# Patient Record
Sex: FEMALE | Race: WHITE | NOT HISPANIC OR LATINO | Employment: OTHER | URBAN - METROPOLITAN AREA
[De-identification: names, ages, dates, MRNs, and addresses within clinical notes are randomized per-mention and may not be internally consistent; named-entity substitution may affect disease eponyms.]

---

## 2023-06-05 PROBLEM — I25.10 PRESENCE OF STENT IN CORONARY ARTERY IN PATIENT WITH CORONARY ARTERY DISEASE: Status: ACTIVE | Noted: 2023-06-05

## 2023-06-05 PROBLEM — M85.80 OSTEOPENIA: Status: ACTIVE | Noted: 2023-06-05

## 2023-06-05 PROBLEM — M17.9 OSTEOARTHRITIS OF KNEE: Status: ACTIVE | Noted: 2023-06-05

## 2023-06-05 PROBLEM — E78.5 HYPERLIPIDEMIA: Status: ACTIVE | Noted: 2023-06-05

## 2023-06-05 PROBLEM — Z95.5 PRESENCE OF STENT IN CORONARY ARTERY IN PATIENT WITH CORONARY ARTERY DISEASE: Status: ACTIVE | Noted: 2023-06-05

## 2023-06-05 PROBLEM — R13.10 DYSPHAGIA: Status: ACTIVE | Noted: 2023-06-05

## 2023-06-05 PROBLEM — R53.83 FATIGUE: Status: ACTIVE | Noted: 2023-06-05

## 2023-06-05 PROBLEM — J30.9 ALLERGIC RHINITIS: Status: ACTIVE | Noted: 2023-06-05

## 2023-06-05 PROBLEM — R42 DIZZINESS: Status: ACTIVE | Noted: 2023-06-05

## 2023-06-05 PROBLEM — K22.2 SCHATZKI'S RING OF DISTAL ESOPHAGUS: Status: ACTIVE | Noted: 2023-06-05

## 2023-06-05 PROBLEM — I10 HTN (HYPERTENSION), BENIGN: Status: ACTIVE | Noted: 2023-06-05

## 2023-06-05 PROBLEM — I25.10 CORONARY ARTERY DISEASE INVOLVING NATIVE CORONARY ARTERY OF NATIVE HEART WITHOUT ANGINA PECTORIS: Status: ACTIVE | Noted: 2023-06-05

## 2023-06-05 PROBLEM — I21.9 MYOCARDIAL INFARCTION (MULTI): Status: ACTIVE | Noted: 2023-06-05

## 2023-06-05 PROBLEM — M79.10 MYALGIA: Status: ACTIVE | Noted: 2023-06-05

## 2023-06-05 PROBLEM — E66.9 OBESITY (BMI 30-39.9): Status: ACTIVE | Noted: 2023-06-05

## 2023-06-05 PROBLEM — Z86.010 HISTORY OF COLON POLYPS: Status: ACTIVE | Noted: 2023-06-05

## 2023-06-05 PROBLEM — K29.80 DUODENITIS: Status: ACTIVE | Noted: 2023-06-05

## 2023-06-05 PROBLEM — Z95.5 PRESENCE OF STENT IN CORONARY ARTERY: Status: ACTIVE | Noted: 2023-06-05

## 2023-06-05 PROBLEM — K44.9 HIATAL HERNIA: Status: ACTIVE | Noted: 2023-06-05

## 2023-06-05 PROBLEM — F32.A DEPRESSION: Status: ACTIVE | Noted: 2023-06-05

## 2023-06-05 PROBLEM — Z86.0100 HISTORY OF COLON POLYPS: Status: ACTIVE | Noted: 2023-06-05

## 2023-06-05 PROBLEM — K29.70 GASTRITIS: Status: ACTIVE | Noted: 2023-06-05

## 2023-06-05 RX ORDER — OMEPRAZOLE 20 MG/1
1 CAPSULE, DELAYED RELEASE ORAL DAILY
COMMUNITY
Start: 2022-12-06 | End: 2023-10-25 | Stop reason: SDUPTHER

## 2023-06-05 RX ORDER — METOPROLOL SUCCINATE 50 MG/1
1 TABLET, EXTENDED RELEASE ORAL DAILY
COMMUNITY
Start: 2014-08-05

## 2023-06-05 RX ORDER — CLOPIDOGREL BISULFATE 75 MG/1
1 TABLET ORAL DAILY
COMMUNITY
Start: 2021-01-19 | End: 2023-12-12 | Stop reason: SDUPTHER

## 2023-06-05 RX ORDER — ATORVASTATIN CALCIUM 40 MG/1
1 TABLET, FILM COATED ORAL DAILY
COMMUNITY
Start: 2020-12-22 | End: 2023-10-16 | Stop reason: SDUPTHER

## 2023-06-05 RX ORDER — MULTIVITAMIN
TABLET ORAL
COMMUNITY

## 2023-06-05 RX ORDER — ASPIRIN 81 MG/1
TABLET ORAL
COMMUNITY

## 2023-06-05 RX ORDER — METOPROLOL TARTRATE AND HYDROCHLOROTHIAZIDE 50; 25 MG/1; MG/1
1 TABLET ORAL DAILY
COMMUNITY
Start: 2022-07-27 | End: 2023-07-31 | Stop reason: SDUPTHER

## 2023-06-06 ENCOUNTER — OFFICE VISIT (OUTPATIENT)
Dept: PRIMARY CARE | Facility: CLINIC | Age: 73
End: 2023-06-06
Payer: MEDICARE

## 2023-06-06 ENCOUNTER — LAB (OUTPATIENT)
Dept: LAB | Facility: LAB | Age: 73
End: 2023-06-06
Payer: MEDICARE

## 2023-06-06 VITALS
RESPIRATION RATE: 18 BRPM | SYSTOLIC BLOOD PRESSURE: 132 MMHG | WEIGHT: 206 LBS | DIASTOLIC BLOOD PRESSURE: 80 MMHG | TEMPERATURE: 95.7 F | OXYGEN SATURATION: 97 % | HEART RATE: 77 BPM | BODY MASS INDEX: 35.36 KG/M2

## 2023-06-06 DIAGNOSIS — Z00.00 ANNUAL PHYSICAL EXAM: ICD-10-CM

## 2023-06-06 DIAGNOSIS — I25.10 PRESENCE OF STENT IN CORONARY ARTERY IN PATIENT WITH CORONARY ARTERY DISEASE: ICD-10-CM

## 2023-06-06 DIAGNOSIS — I25.10 CORONARY ARTERY DISEASE INVOLVING NATIVE CORONARY ARTERY OF NATIVE HEART WITHOUT ANGINA PECTORIS: ICD-10-CM

## 2023-06-06 DIAGNOSIS — Z95.5 PRESENCE OF STENT IN CORONARY ARTERY IN PATIENT WITH CORONARY ARTERY DISEASE: ICD-10-CM

## 2023-06-06 DIAGNOSIS — E66.01 OBESITY, MORBID (MULTI): ICD-10-CM

## 2023-06-06 DIAGNOSIS — I10 HTN (HYPERTENSION), BENIGN: ICD-10-CM

## 2023-06-06 DIAGNOSIS — Z13.31 DEPRESSION SCREEN: ICD-10-CM

## 2023-06-06 DIAGNOSIS — Z71.89 ADVANCE DIRECTIVE DISCUSSED WITH PATIENT: ICD-10-CM

## 2023-06-06 DIAGNOSIS — Z86.010 HISTORY OF COLON POLYPS: ICD-10-CM

## 2023-06-06 DIAGNOSIS — Z12.31 ENCOUNTER FOR SCREENING MAMMOGRAM FOR BREAST CANCER: ICD-10-CM

## 2023-06-06 DIAGNOSIS — Z13.39 ALCOHOL SCREENING: ICD-10-CM

## 2023-06-06 DIAGNOSIS — Z00.00 MEDICARE ANNUAL WELLNESS VISIT, SUBSEQUENT: Primary | ICD-10-CM

## 2023-06-06 LAB
ALANINE AMINOTRANSFERASE (SGPT) (U/L) IN SER/PLAS: 15 U/L (ref 7–45)
ALBUMIN (G/DL) IN SER/PLAS: 4.3 G/DL (ref 3.4–5)
ALKALINE PHOSPHATASE (U/L) IN SER/PLAS: 80 U/L (ref 33–136)
ANION GAP IN SER/PLAS: 14 MMOL/L (ref 10–20)
ASPARTATE AMINOTRANSFERASE (SGOT) (U/L) IN SER/PLAS: 18 U/L (ref 9–39)
BASOPHILS (10*3/UL) IN BLOOD BY AUTOMATED COUNT: 0.04 X10E9/L (ref 0–0.1)
BASOPHILS/100 LEUKOCYTES IN BLOOD BY AUTOMATED COUNT: 0.4 % (ref 0–2)
BILIRUBIN DIRECT (MG/DL) IN SER/PLAS: 0.1 MG/DL (ref 0–0.3)
BILIRUBIN TOTAL (MG/DL) IN SER/PLAS: 0.9 MG/DL (ref 0–1.2)
CALCIUM (MG/DL) IN SER/PLAS: 9.6 MG/DL (ref 8.6–10.3)
CARBON DIOXIDE, TOTAL (MMOL/L) IN SER/PLAS: 33 MMOL/L (ref 21–32)
CHLORIDE (MMOL/L) IN SER/PLAS: 99 MMOL/L (ref 98–107)
CHOLESTEROL (MG/DL) IN SER/PLAS: 197 MG/DL (ref 0–199)
CHOLESTEROL IN HDL (MG/DL) IN SER/PLAS: 57.9 MG/DL
CHOLESTEROL/HDL RATIO: 3.4
CREATININE (MG/DL) IN SER/PLAS: 1.02 MG/DL (ref 0.5–1.05)
EOSINOPHILS (10*3/UL) IN BLOOD BY AUTOMATED COUNT: 0.16 X10E9/L (ref 0–0.4)
EOSINOPHILS/100 LEUKOCYTES IN BLOOD BY AUTOMATED COUNT: 1.7 % (ref 0–6)
ERYTHROCYTE DISTRIBUTION WIDTH (RATIO) BY AUTOMATED COUNT: 12.5 % (ref 11.5–14.5)
ERYTHROCYTE MEAN CORPUSCULAR HEMOGLOBIN CONCENTRATION (G/DL) BY AUTOMATED: 32.4 G/DL (ref 32–36)
ERYTHROCYTE MEAN CORPUSCULAR VOLUME (FL) BY AUTOMATED COUNT: 95 FL (ref 80–100)
ERYTHROCYTES (10*6/UL) IN BLOOD BY AUTOMATED COUNT: 4.59 X10E12/L (ref 4–5.2)
GFR FEMALE: 58 ML/MIN/1.73M2
GLUCOSE (MG/DL) IN SER/PLAS: 92 MG/DL (ref 74–99)
HEMATOCRIT (%) IN BLOOD BY AUTOMATED COUNT: 43.5 % (ref 36–46)
HEMOGLOBIN (G/DL) IN BLOOD: 14.1 G/DL (ref 12–16)
IMMATURE GRANULOCYTES/100 LEUKOCYTES IN BLOOD BY AUTOMATED COUNT: 0.3 % (ref 0–0.9)
LEUKOCYTES (10*3/UL) IN BLOOD BY AUTOMATED COUNT: 9.3 X10E9/L (ref 4.4–11.3)
LYMPHOCYTES (10*3/UL) IN BLOOD BY AUTOMATED COUNT: 2.08 X10E9/L (ref 0.8–3)
LYMPHOCYTES/100 LEUKOCYTES IN BLOOD BY AUTOMATED COUNT: 22.4 % (ref 13–44)
MONOCYTES (10*3/UL) IN BLOOD BY AUTOMATED COUNT: 0.84 X10E9/L (ref 0.05–0.8)
MONOCYTES/100 LEUKOCYTES IN BLOOD BY AUTOMATED COUNT: 9.1 % (ref 2–10)
NEUTROPHILS (10*3/UL) IN BLOOD BY AUTOMATED COUNT: 6.12 X10E9/L (ref 1.6–5.5)
NEUTROPHILS/100 LEUKOCYTES IN BLOOD BY AUTOMATED COUNT: 66.1 % (ref 40–80)
NON-HDL CHOLESTEROL: 139 MG/DL
NRBC (PER 100 WBCS) BY AUTOMATED COUNT: 0 /100 WBC (ref 0–0)
PLATELETS (10*3/UL) IN BLOOD AUTOMATED COUNT: 309 X10E9/L (ref 150–450)
POTASSIUM (MMOL/L) IN SER/PLAS: 4 MMOL/L (ref 3.5–5.3)
PROTEIN TOTAL: 7.1 G/DL (ref 6.4–8.2)
SODIUM (MMOL/L) IN SER/PLAS: 142 MMOL/L (ref 136–145)
THYROTROPIN (MIU/L) IN SER/PLAS BY DETECTION LIMIT <= 0.05 MIU/L: 2.59 MIU/L (ref 0.44–3.98)
UREA NITROGEN (MG/DL) IN SER/PLAS: 20 MG/DL (ref 6–23)

## 2023-06-06 PROCEDURE — 80048 BASIC METABOLIC PNL TOTAL CA: CPT

## 2023-06-06 PROCEDURE — 1159F MED LIST DOCD IN RCRD: CPT | Performed by: INTERNAL MEDICINE

## 2023-06-06 PROCEDURE — 99213 OFFICE O/P EST LOW 20 MIN: CPT | Performed by: INTERNAL MEDICINE

## 2023-06-06 PROCEDURE — 1036F TOBACCO NON-USER: CPT | Performed by: INTERNAL MEDICINE

## 2023-06-06 PROCEDURE — 99497 ADVNCD CARE PLAN 30 MIN: CPT | Performed by: INTERNAL MEDICINE

## 2023-06-06 PROCEDURE — 83718 ASSAY OF LIPOPROTEIN: CPT

## 2023-06-06 PROCEDURE — 82465 ASSAY BLD/SERUM CHOLESTEROL: CPT

## 2023-06-06 PROCEDURE — 85025 COMPLETE CBC W/AUTO DIFF WBC: CPT

## 2023-06-06 PROCEDURE — 1170F FXNL STATUS ASSESSED: CPT | Performed by: INTERNAL MEDICINE

## 2023-06-06 PROCEDURE — 84443 ASSAY THYROID STIM HORMONE: CPT

## 2023-06-06 PROCEDURE — 36415 COLL VENOUS BLD VENIPUNCTURE: CPT

## 2023-06-06 PROCEDURE — 99397 PER PM REEVAL EST PAT 65+ YR: CPT | Performed by: INTERNAL MEDICINE

## 2023-06-06 PROCEDURE — 3079F DIAST BP 80-89 MM HG: CPT | Performed by: INTERNAL MEDICINE

## 2023-06-06 PROCEDURE — G0439 PPPS, SUBSEQ VISIT: HCPCS | Performed by: INTERNAL MEDICINE

## 2023-06-06 PROCEDURE — G0444 DEPRESSION SCREEN ANNUAL: HCPCS | Performed by: INTERNAL MEDICINE

## 2023-06-06 PROCEDURE — 3075F SYST BP GE 130 - 139MM HG: CPT | Performed by: INTERNAL MEDICINE

## 2023-06-06 PROCEDURE — 80076 HEPATIC FUNCTION PANEL: CPT

## 2023-06-06 PROCEDURE — G0442 ANNUAL ALCOHOL SCREEN 15 MIN: HCPCS | Performed by: INTERNAL MEDICINE

## 2023-06-06 PROCEDURE — G0446 INTENS BEHAVE THER CARDIO DX: HCPCS | Performed by: INTERNAL MEDICINE

## 2023-06-06 RX ORDER — IBUPROFEN 600 MG/1
TABLET ORAL EVERY 8 HOURS
COMMUNITY

## 2023-06-06 ASSESSMENT — ACTIVITIES OF DAILY LIVING (ADL)
GROCERY_SHOPPING: INDEPENDENT
MANAGING_FINANCES: INDEPENDENT
BATHING: INDEPENDENT
TAKING_MEDICATION: INDEPENDENT
DRESSING: INDEPENDENT
DOING_HOUSEWORK: INDEPENDENT

## 2023-06-06 ASSESSMENT — PATIENT HEALTH QUESTIONNAIRE - PHQ9
2. FEELING DOWN, DEPRESSED OR HOPELESS: NOT AT ALL
SUM OF ALL RESPONSES TO PHQ9 QUESTIONS 1 AND 2: 0
1. LITTLE INTEREST OR PLEASURE IN DOING THINGS: NOT AT ALL

## 2023-06-06 NOTE — PROGRESS NOTES
My nurse note reviewed. Patient is here for:  Medicare Annual Wellness Visit Subsequent       Here for Medicare wellness, physical and follow up    Patient denies any shortness of breath, PND, orthopnea, chest pain , palpitation, syncope or edema in legs  patient denies any abdominal pain, tenderness, nausea, vomiting, change in bowel habits or blood in stool.  Patient denies any weakness in extremities.. Denies any headache, visual symptoms , speech problems or  tremors . No TIA or stroke like symptoms..    Taking meds ok     Lives alone  Patient is independent with ADLs. Patient does drive.   walks without assistance.   No recent falls.    During Medicare wellness apart from looking at assessment done by nurse,  I also asked following :    Alcohol use : Alcohol screening  was  done during this visit. Patient is not having any issue with increase alcohol use . No ETOH abuse was observed by history . Not drinking alcohol     HIV test: patient was not found to be high risk for HIV     Cognitive issue : None     Advanced Directive: Patient doesn't have advanced directive including living will and Health care power of . Discussed about it. Encouraged patient to make living will and POA. Questions related to it answered.     I have reviewed all active medications patient is currently on . Questions related to medication answered to patient's satisfaction.    REVIEW OF SYSTEMS:   All other systems have been reviewed and are negative in relation to patient's complaint and other than what is mentioned in History of present illness.        OBJECTIVE :  Vitals noted   /80   Pulse 77   Temp 35.4 °C (95.7 °F)   Resp 18   Wt 93.4 kg (206 lb)   SpO2 97%   BMI 35.36 kg/m²   obese  Not in acute distress  Conj Pink, No icterus  ears exam normal  Neck:No Cervical LN enlargement, No Thyroid enlargement No carotid bruit  Lungs: good air entry bilaterally, no rales or rhonchi  Breast examination: Breasts are large,  symmetric. No dominant or discrete suspicious masses noted in breast area.  No nipple changes or discharge noted.  CVS: S1 S2 + , no S3. No loud heart murmur heard.   Abdomen: Soft, non tender , BS +. no organomegaly , no CVA tenderness  MSK: No spine tenderness or muscle tenderness noted on gross examination  CNS: Pt is alert, moving all 4 extremities. no motor weakness or abnormal movements noted on gross examination.  Extremities: No edema, No calf tenderness, Jude's sign negative. DTR + knee and wrists, Rhomberg's neg  During Medicare wellness patients chronic diagnosis were reviewed and questions related to it answered to patient's satisfaction . Risk factors for heart, stroke were discussed with patient . Discussion about preventative tests were done with patient also . pain issue was discussed as appropriate for patient .  ASCVD score is 14 %   Assessment:  1. Medicare annual wellness visit, subsequent -done   2. Obesity, morbid (CMS/[facility]) -wt reduction   3. Annual physical exam -done. Tests ordered   4. Alcohol screening    5. Depression screen    6. Encounter for screening mammogram for breast cancer    7. Advance directive discussed with patient    8. Presence of stent in coronary artery in patient with coronary artery disease -stable   9. Coronary artery disease involving native coronary artery of native heart without angina pectoris    10. HTN (hypertension), benign -controlled   11. History of colon polyps -will do colon test next yr       Plan:   Medicare wellness done   Physical exam done . Tests ordered  Discussed about obesity and complications related to it. Discussed about weight reduction and regular exercise to decrease weight. Questions related to it answered to patient's satisfaction.   Mammogram   Blood tests ordered   Current medications are effective. advised to continue current medications.  F/U 6 months or as needed

## 2023-06-06 NOTE — PATIENT INSTRUCTIONS
Plan:   Medicare wellness done   Physical exam done . Tests ordered  Discussed about obesity and complications related to it. Discussed about weight reduction and regular exercise to decrease weight. Questions related to it answered to patient's satisfaction.   Mammogram   Blood tests ordered   Current medications are effective. advised to continue current medications.  F/U 6 months or as needed

## 2023-06-08 ENCOUNTER — TELEPHONE (OUTPATIENT)
Dept: PRIMARY CARE | Facility: CLINIC | Age: 73
End: 2023-06-08
Payer: MEDICARE

## 2023-06-08 NOTE — TELEPHONE ENCOUNTER
----- Message from Gaby Berkowitz sent at 6/8/2023  9:25 AM EDT -----    ----- Message -----  From: Mark Diamond MD  Sent: 6/8/2023   9:01 AM EDT  To: Gaby Berkowitz    I have reviewed your recent blood tests ordered by me and there were no significant abnormality noted.   Please notify patient. Thanks.

## 2023-07-31 DIAGNOSIS — I10 HTN (HYPERTENSION), BENIGN: Primary | ICD-10-CM

## 2023-07-31 RX ORDER — METOPROLOL TARTRATE AND HYDROCHLOROTHIAZIDE 50; 25 MG/1; MG/1
1 TABLET ORAL DAILY
Qty: 90 TABLET | Refills: 3 | Status: SHIPPED | OUTPATIENT
Start: 2023-07-31

## 2023-08-11 NOTE — TELEPHONE ENCOUNTER
Left message for patient informing patient that refill was called over to pharmacy on 7/31, spoke with pharmacy to clarify that that there was a refill available for patient to .

## 2023-10-16 DIAGNOSIS — I25.10 CORONARY ARTERY DISEASE INVOLVING NATIVE CORONARY ARTERY OF NATIVE HEART WITHOUT ANGINA PECTORIS: Primary | ICD-10-CM

## 2023-10-16 RX ORDER — BNT162B2 ORIGINAL AND OMICRON BA.4/BA.5 .1125; .1125 MG/2.25ML; MG/2.25ML
0.3 INJECTION, SUSPENSION INTRAMUSCULAR ONCE AS NEEDED
COMMUNITY
Start: 2022-10-23

## 2023-10-16 RX ORDER — ATORVASTATIN CALCIUM 40 MG/1
40 TABLET, FILM COATED ORAL DAILY
Qty: 90 TABLET | Refills: 3 | Status: SHIPPED | OUTPATIENT
Start: 2023-10-16

## 2023-10-25 DIAGNOSIS — K44.9 HIATAL HERNIA: Primary | ICD-10-CM

## 2023-10-26 RX ORDER — OMEPRAZOLE 20 MG/1
20 CAPSULE, DELAYED RELEASE ORAL DAILY
Qty: 90 CAPSULE | Refills: 3 | Status: SHIPPED | OUTPATIENT
Start: 2023-10-26

## 2023-12-05 ENCOUNTER — OFFICE VISIT (OUTPATIENT)
Dept: PRIMARY CARE | Facility: CLINIC | Age: 73
End: 2023-12-05
Payer: MEDICARE

## 2023-12-05 VITALS
DIASTOLIC BLOOD PRESSURE: 84 MMHG | BODY MASS INDEX: 35.02 KG/M2 | SYSTOLIC BLOOD PRESSURE: 138 MMHG | HEART RATE: 67 BPM | OXYGEN SATURATION: 97 % | TEMPERATURE: 96.4 F | WEIGHT: 204 LBS

## 2023-12-05 DIAGNOSIS — I25.10 PRESENCE OF STENT IN CORONARY ARTERY IN PATIENT WITH CORONARY ARTERY DISEASE: ICD-10-CM

## 2023-12-05 DIAGNOSIS — E66.01 OBESITY, MORBID (MULTI): ICD-10-CM

## 2023-12-05 DIAGNOSIS — I10 HTN (HYPERTENSION), BENIGN: ICD-10-CM

## 2023-12-05 DIAGNOSIS — I25.10 CORONARY ARTERY DISEASE INVOLVING NATIVE CORONARY ARTERY OF NATIVE HEART WITHOUT ANGINA PECTORIS: Primary | ICD-10-CM

## 2023-12-05 DIAGNOSIS — Z95.5 PRESENCE OF STENT IN CORONARY ARTERY IN PATIENT WITH CORONARY ARTERY DISEASE: ICD-10-CM

## 2023-12-05 PROCEDURE — 1036F TOBACCO NON-USER: CPT | Performed by: INTERNAL MEDICINE

## 2023-12-05 PROCEDURE — 3079F DIAST BP 80-89 MM HG: CPT | Performed by: INTERNAL MEDICINE

## 2023-12-05 PROCEDURE — 1159F MED LIST DOCD IN RCRD: CPT | Performed by: INTERNAL MEDICINE

## 2023-12-05 PROCEDURE — 3075F SYST BP GE 130 - 139MM HG: CPT | Performed by: INTERNAL MEDICINE

## 2023-12-05 PROCEDURE — 1126F AMNT PAIN NOTED NONE PRSNT: CPT | Performed by: INTERNAL MEDICINE

## 2023-12-05 PROCEDURE — 99214 OFFICE O/P EST MOD 30 MIN: CPT | Performed by: INTERNAL MEDICINE

## 2023-12-05 ASSESSMENT — PATIENT HEALTH QUESTIONNAIRE - PHQ9
1. LITTLE INTEREST OR PLEASURE IN DOING THINGS: NOT AT ALL
SUM OF ALL RESPONSES TO PHQ9 QUESTIONS 1 AND 2: 0
2. FEELING DOWN, DEPRESSED OR HOPELESS: NOT AT ALL

## 2023-12-05 NOTE — PATIENT INSTRUCTIONS
Plan  Advised to get her mammogram done .   Current medications are effective. advised to continue current medications.   F/U with heart doctor as advised   Discussed about obesity and complications related to it. Discussed about weight reduction and regular exercise to decrease weight. Questions related to it answered to patient's satisfaction.  F/U in June for medicare wellness

## 2023-12-05 NOTE — PROGRESS NOTES
My nurse note reviewed. Patient is here for:  Follow-up       Here for f/U on HTN , heart  Not doing much exercise  Patient denies any shortness of breath, PND, orthopnea, chest pain , palpitation, syncope or edema in legs    Patient denies any weakness in extremities.. Denies any headache, visual symptoms , speech problems or  tremors . No TIA or stroke like symptoms..    Taking meds ok  OBJECTIVE :  obese  /84   Pulse 67   Temp 35.8 °C (96.4 °F)   Wt 92.5 kg (204 lb)   SpO2 97%   BMI 35.02 kg/m²   CVS: S1 S2 + , no S3. No loud heart murmur appreciated. Lungs clear, No edema  CNS: Patient is alert, oriented moving all 4 extremities well. No motor weakness noted on gross neurological exam. No involuntary movements or tremors noted.    Assessment:  1. Coronary artery disease involving native coronary artery of native heart without angina pectoris -stable   2. HTN (hypertension), benign -controlled     3. Presence of stent in coronary artery in patient with coronary artery disease -sees cardiologist   4. Obesity, morbid (CMS/HCC) -wt reduction      Plan  Advised to get her mammogram done .   Current medications are effective. advised to continue current medications.   F/U with heart doctor as advised   Discussed about obesity and complications related to it. Discussed about weight reduction and regular exercise to decrease weight. Questions related to it answered to patient's satisfaction.  F/U in June for medicare wellness

## 2023-12-12 DIAGNOSIS — I25.10 PRESENCE OF STENT IN CORONARY ARTERY IN PATIENT WITH CORONARY ARTERY DISEASE: ICD-10-CM

## 2023-12-12 DIAGNOSIS — Z95.5 PRESENCE OF STENT IN CORONARY ARTERY IN PATIENT WITH CORONARY ARTERY DISEASE: ICD-10-CM

## 2023-12-12 RX ORDER — CLOPIDOGREL BISULFATE 75 MG/1
75 TABLET ORAL DAILY
Qty: 90 TABLET | Refills: 1 | Status: SHIPPED | OUTPATIENT
Start: 2023-12-12

## 2023-12-13 ENCOUNTER — ANCILLARY PROCEDURE (OUTPATIENT)
Dept: RADIOLOGY | Facility: CLINIC | Age: 73
End: 2023-12-13
Payer: MEDICARE

## 2023-12-13 DIAGNOSIS — Z12.31 ENCOUNTER FOR SCREENING MAMMOGRAM FOR BREAST CANCER: ICD-10-CM

## 2023-12-13 PROCEDURE — 77067 SCR MAMMO BI INCL CAD: CPT | Mod: BILATERAL PROCEDURE | Performed by: RADIOLOGY

## 2023-12-13 PROCEDURE — 77063 BREAST TOMOSYNTHESIS BI: CPT | Mod: BILATERAL PROCEDURE | Performed by: RADIOLOGY

## 2023-12-13 PROCEDURE — 77067 SCR MAMMO BI INCL CAD: CPT

## 2023-12-18 ENCOUNTER — TELEPHONE (OUTPATIENT)
Dept: PRIMARY CARE | Facility: CLINIC | Age: 73
End: 2023-12-18
Payer: MEDICARE

## 2023-12-18 NOTE — TELEPHONE ENCOUNTER
----- Message from Mark Diamond MD sent at 12/14/2023 12:39 PM EST -----  Please make virtual or phone appointment  in few days to a week to  go over patient's abnormal  test result and to decide on further intervention . Please notify patient. Thanks.    ----- Message -----  From: Interface, Radiology Results In  Sent: 12/13/2023   3:34 PM EST  To: Mark Diamond MD

## 2023-12-19 ENCOUNTER — TELEPHONE (OUTPATIENT)
Dept: PRIMARY CARE | Facility: CLINIC | Age: 73
End: 2023-12-19
Payer: MEDICARE

## 2023-12-27 ENCOUNTER — TELEPHONE (OUTPATIENT)
Dept: PRIMARY CARE | Facility: CLINIC | Age: 73
End: 2023-12-27
Payer: MEDICARE

## 2024-01-03 ENCOUNTER — TELEPHONE (OUTPATIENT)
Dept: PRIMARY CARE | Facility: CLINIC | Age: 74
End: 2024-01-03
Payer: MEDICARE

## 2024-01-08 ENCOUNTER — TELEMEDICINE (OUTPATIENT)
Dept: PRIMARY CARE | Facility: CLINIC | Age: 74
End: 2024-01-08
Payer: MEDICARE

## 2024-01-08 DIAGNOSIS — R92.8 ABNORMAL MAMMOGRAM: Primary | ICD-10-CM

## 2024-01-08 PROCEDURE — 99442 PR PHYS/QHP TELEPHONE EVALUATION 11-20 MIN: CPT | Performed by: INTERNAL MEDICINE

## 2024-01-08 NOTE — PROGRESS NOTES
"\"This visit was completed via telephone due to the restrictions of the COVID-19 pandemic and/or other reason. All issues as below were discussed and addressed but no physical exam was performed. If it was felt that the patient should be evaluated in clinic then they were directed there. The patient verbally consented to visit.\"    Talked to patient  over phone .    patient recently had mammogram and it was abnormal.    Questions about it answered to patient. Differential diagnosis discussed. All questions answered    Question about Family history of breast cancer was also asked during conversation . She denies any FH of breast cancer. She says mother had some other kind of cancer.     OBJECTIVE :  Over phone patient didn't seem to be  in any acute distress . patient is alert, oriented, answering appropriately to questions asked.  Physical exam was not performed due to virtual/Telephone visit .    Assessment:  Abnormal Mammogram     Plan:  Diagnostic mammogram and ultrasound  ordered.   All questions related to it answered  I spent more than 12 minutes of time with patient and /or family. Greater than 50% of this time was spent in counseling and /or coordination of care.  patient agreed with plan and felt satisfied.    "

## 2024-02-14 ENCOUNTER — HOSPITAL ENCOUNTER (OUTPATIENT)
Dept: RADIOLOGY | Facility: CLINIC | Age: 74
Discharge: HOME | End: 2024-02-14
Payer: MEDICARE

## 2024-02-14 DIAGNOSIS — R92.8 ABNORMAL MAMMOGRAM: ICD-10-CM

## 2024-02-14 PROCEDURE — 76642 ULTRASOUND BREAST LIMITED: CPT | Mod: LEFT SIDE | Performed by: RADIOLOGY

## 2024-02-14 PROCEDURE — 76642 ULTRASOUND BREAST LIMITED: CPT | Mod: LT

## 2024-06-11 ENCOUNTER — APPOINTMENT (OUTPATIENT)
Dept: PRIMARY CARE | Facility: CLINIC | Age: 74
End: 2024-06-11
Payer: MEDICARE

## 2024-06-11 VITALS
HEART RATE: 58 BPM | WEIGHT: 195 LBS | TEMPERATURE: 93.7 F | SYSTOLIC BLOOD PRESSURE: 128 MMHG | DIASTOLIC BLOOD PRESSURE: 82 MMHG | BODY MASS INDEX: 33.47 KG/M2 | OXYGEN SATURATION: 96 %

## 2024-06-11 DIAGNOSIS — Z13.31 DEPRESSION SCREEN: ICD-10-CM

## 2024-06-11 DIAGNOSIS — Z00.00 MEDICARE ANNUAL WELLNESS VISIT, SUBSEQUENT: Primary | ICD-10-CM

## 2024-06-11 DIAGNOSIS — Z12.31 ENCOUNTER FOR SCREENING MAMMOGRAM FOR BREAST CANCER: ICD-10-CM

## 2024-06-11 DIAGNOSIS — Z95.5 PRESENCE OF STENT IN CORONARY ARTERY IN PATIENT WITH CORONARY ARTERY DISEASE: ICD-10-CM

## 2024-06-11 DIAGNOSIS — Z00.00 ANNUAL PHYSICAL EXAM: ICD-10-CM

## 2024-06-11 DIAGNOSIS — Z71.89 ADVANCE DIRECTIVE DISCUSSED WITH PATIENT: ICD-10-CM

## 2024-06-11 DIAGNOSIS — Z13.39 ALCOHOL SCREENING: ICD-10-CM

## 2024-06-11 DIAGNOSIS — E78.5 HYPERLIPIDEMIA, UNSPECIFIED HYPERLIPIDEMIA TYPE: ICD-10-CM

## 2024-06-11 DIAGNOSIS — I25.10 PRESENCE OF STENT IN CORONARY ARTERY IN PATIENT WITH CORONARY ARTERY DISEASE: ICD-10-CM

## 2024-06-11 DIAGNOSIS — Z78.0 POSTMENOPAUSAL: ICD-10-CM

## 2024-06-11 DIAGNOSIS — Z86.010 HISTORY OF COLON POLYPS: ICD-10-CM

## 2024-06-11 DIAGNOSIS — E66.9 OBESITY (BMI 30-39.9): ICD-10-CM

## 2024-06-11 DIAGNOSIS — I10 HTN (HYPERTENSION), BENIGN: ICD-10-CM

## 2024-06-11 PROCEDURE — 1170F FXNL STATUS ASSESSED: CPT | Performed by: INTERNAL MEDICINE

## 2024-06-11 PROCEDURE — 3079F DIAST BP 80-89 MM HG: CPT | Performed by: INTERNAL MEDICINE

## 2024-06-11 PROCEDURE — 99397 PER PM REEVAL EST PAT 65+ YR: CPT | Performed by: INTERNAL MEDICINE

## 2024-06-11 PROCEDURE — 1036F TOBACCO NON-USER: CPT | Performed by: INTERNAL MEDICINE

## 2024-06-11 PROCEDURE — 99497 ADVNCD CARE PLAN 30 MIN: CPT | Performed by: INTERNAL MEDICINE

## 2024-06-11 PROCEDURE — 99213 OFFICE O/P EST LOW 20 MIN: CPT | Performed by: INTERNAL MEDICINE

## 2024-06-11 PROCEDURE — 1159F MED LIST DOCD IN RCRD: CPT | Performed by: INTERNAL MEDICINE

## 2024-06-11 PROCEDURE — G0442 ANNUAL ALCOHOL SCREEN 15 MIN: HCPCS | Performed by: INTERNAL MEDICINE

## 2024-06-11 PROCEDURE — 3074F SYST BP LT 130 MM HG: CPT | Performed by: INTERNAL MEDICINE

## 2024-06-11 PROCEDURE — G0444 DEPRESSION SCREEN ANNUAL: HCPCS | Performed by: INTERNAL MEDICINE

## 2024-06-11 PROCEDURE — G0439 PPPS, SUBSEQ VISIT: HCPCS | Performed by: INTERNAL MEDICINE

## 2024-06-11 ASSESSMENT — ACTIVITIES OF DAILY LIVING (ADL)
GROCERY_SHOPPING: INDEPENDENT
BATHING: INDEPENDENT
MANAGING_FINANCES: INDEPENDENT
DRESSING: INDEPENDENT
TAKING_MEDICATION: INDEPENDENT
DOING_HOUSEWORK: INDEPENDENT

## 2024-06-11 ASSESSMENT — PATIENT HEALTH QUESTIONNAIRE - PHQ9
1. LITTLE INTEREST OR PLEASURE IN DOING THINGS: NOT AT ALL
2. FEELING DOWN, DEPRESSED OR HOPELESS: NOT AT ALL
SUM OF ALL RESPONSES TO PHQ9 QUESTIONS 1 AND 2: 0

## 2024-06-11 NOTE — PATIENT INSTRUCTIONS
Plan  Medicare wellness done.   Annual exam done. Tests ordered  Discussed about obesity and complications related to it. Discussed about weight reduction and regular exercise to decrease weight. Questions related to it answered to patient's satisfaction.  Bone density test  mammogrAM   BLOOD TESTS ordered  Current medications are effective. advised to continue current medications.  F/U 6 months and in 12 months for medicare wellness

## 2024-06-11 NOTE — PROGRESS NOTES
My nurse note reviewed. Patient is here for:  Medicare Annual Wellness Visit Subsequent (Skin tag/ possible mole)       Pt is here for medicare wellness , physical and follow up   Lives with cat  Patient denies any shortness of breath, PND, orthopnea, chest pain , palpitation, syncope or edema in legs. Hx of stent . Sees cardiologist   patient denies any abdominal pain, tenderness, nausea, vomiting, change in bowel habits or blood in stool.  Patient denies any weakness in extremities.. Denies any headache, visual symptoms , speech problems or  tremors . No TIA or stroke like symptoms..    Taking meds ok     Over the past 2 weeks, how often have you been bothered by any of the following problems?  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 Declines hearing test     During Medicare wellness apart from looking at assessment done by nurse,  I also asked following :    Alcohol use : Alcohol screening  was  done during this visit. Patient is not having any issue with increase alcohol use . No ETOH abuse was observed by history . Doesnot drink alcohle  Non smoker  HIV test: patient was not found to be high risk for HIV     Cognitive issue : None     Advance directives:. Advance Care Planning discussed and documented in the medical record, patient did not wish or was not able to name a surrogate decision maker or provide an advance care plan. Patient  has no living will. Patient has no healthcare POA. Total time was > 16 min    Additional Information: discussed about living will. Questions answered to patient's satisfaction.    I have reviewed all active medications patient is currently on . Questions related to medication answered to patient's satisfaction.    Has a lesion on neck , would like it to be checked    REVIEW OF SYSTEMS:   All other systems have been reviewed and are negative in relation to patient's complaint and other than what is mentioned in History of present illness.    OBJECTIVE :    /82   Pulse 58   Temp 34.3 °C (93.7 °F)   Wt 88.5 kg (195 lb)   SpO2 96%   BMI 33.47 kg/m²   Vitals noted, obese  Not in acute distress  Conj Pink, No icterus  ears exam normal  Neck:No Cervical LN enlargement, No Thyroid enlargement No carotid bruit  Lungs: good air entry bilaterally, no rales or rhonchi  CVS: S1 S2 + , no S3. No loud heart murmur heard.   Abdomen: Soft, non tender , BS +. no organomegaly , no CVA tenderness  MSK: No spine tenderness or muscle tenderness noted on gross examination  CNS: Pt is alert, moving all 4 extremities. no motor weakness or abnormal movements noted on gross examination.  Extremities: No edema, No calf tenderness, Jude's sign negative. DTR + knee and wrists, Rhomberg's neg      Assessment:  1. Medicare annual wellness visit, subsequent  Done.       2. Annual physical exam  Done. Tests ordered      3. Alcohol screening  done      4. Depression screen  Done. Doing well.      5. Advance directive discussed with patient        6. Presence of stent in coronary artery in patient with coronary artery disease  Hx of. stable      7. HTN (hypertension), benign  controlled        8. History of colon polyps  She will call to schedule her colonoscopy       9. Hyperlipidemia, unspecified hyperlipidemia type  CBC and Auto Differential    Basic Metabolic Panel    Hepatic Function Panel    Lipid Panel Non-Fasting    Thyroid  Stimulating Hormone      10. Obesity (BMI 30-39.9)  Wt reduction       11. Encounter for screening mammogram for breast cancer  BI mammo bilateral screening tomosynthesis      12. Postmenopausal  XR DEXA bone density        Plan  Medicare wellness done.   Annual exam done. Tests ordered  Discussed about obesity and complications related to it. Discussed about weight reduction and regular exercise to decrease weight. Questions related to it answered to patient's satisfaction.  Bone density test  mammogrAM   BLOOD TESTS ordered  Current medications are effective. advised to continue current medications.  F/U 6 months and in 12 months for medicare wellness

## 2024-07-01 DIAGNOSIS — I25.10 PRESENCE OF STENT IN CORONARY ARTERY IN PATIENT WITH CORONARY ARTERY DISEASE: ICD-10-CM

## 2024-07-01 DIAGNOSIS — Z95.5 PRESENCE OF STENT IN CORONARY ARTERY IN PATIENT WITH CORONARY ARTERY DISEASE: ICD-10-CM

## 2024-07-01 RX ORDER — CLOPIDOGREL BISULFATE 75 MG/1
75 TABLET ORAL DAILY
Qty: 90 TABLET | Refills: 3 | Status: SHIPPED | OUTPATIENT
Start: 2024-07-01 | End: 2025-07-01

## 2024-07-11 ENCOUNTER — LAB (OUTPATIENT)
Dept: LAB | Facility: LAB | Age: 74
End: 2024-07-11
Payer: MEDICARE

## 2024-07-11 DIAGNOSIS — E78.5 HYPERLIPIDEMIA, UNSPECIFIED HYPERLIPIDEMIA TYPE: ICD-10-CM

## 2024-07-11 LAB
ALBUMIN SERPL BCP-MCNC: 4.4 G/DL (ref 3.4–5)
ALP SERPL-CCNC: 63 U/L (ref 33–136)
ALT SERPL W P-5'-P-CCNC: 21 U/L (ref 7–45)
ANION GAP SERPL CALC-SCNC: 15 MMOL/L (ref 10–20)
AST SERPL W P-5'-P-CCNC: 21 U/L (ref 9–39)
BASOPHILS # BLD AUTO: 0.04 X10*3/UL (ref 0–0.1)
BASOPHILS NFR BLD AUTO: 0.5 %
BILIRUB DIRECT SERPL-MCNC: 0.2 MG/DL (ref 0–0.3)
BILIRUB SERPL-MCNC: 0.7 MG/DL (ref 0–1.2)
BUN SERPL-MCNC: 33 MG/DL (ref 6–23)
CALCIUM SERPL-MCNC: 9.3 MG/DL (ref 8.6–10.3)
CHLORIDE SERPL-SCNC: 101 MMOL/L (ref 98–107)
CHOLEST SERPL-MCNC: 144 MG/DL (ref 0–199)
CHOLESTEROL/HDL RATIO: 2.2
CO2 SERPL-SCNC: 27 MMOL/L (ref 21–32)
CREAT SERPL-MCNC: 1.04 MG/DL (ref 0.5–1.05)
EGFRCR SERPLBLD CKD-EPI 2021: 56 ML/MIN/1.73M*2
EOSINOPHIL # BLD AUTO: 0.13 X10*3/UL (ref 0–0.4)
EOSINOPHIL NFR BLD AUTO: 1.7 %
ERYTHROCYTE [DISTWIDTH] IN BLOOD BY AUTOMATED COUNT: 12.8 % (ref 11.5–14.5)
GLUCOSE SERPL-MCNC: 104 MG/DL (ref 74–99)
HCT VFR BLD AUTO: 39.9 % (ref 36–46)
HDLC SERPL-MCNC: 64.5 MG/DL
HGB BLD-MCNC: 13.9 G/DL (ref 12–16)
IMM GRANULOCYTES # BLD AUTO: 0.02 X10*3/UL (ref 0–0.5)
IMM GRANULOCYTES NFR BLD AUTO: 0.3 % (ref 0–0.9)
LYMPHOCYTES # BLD AUTO: 2.19 X10*3/UL (ref 0.8–3)
LYMPHOCYTES NFR BLD AUTO: 28 %
MCH RBC QN AUTO: 32.3 PG (ref 26–34)
MCHC RBC AUTO-ENTMCNC: 34.8 G/DL (ref 32–36)
MCV RBC AUTO: 93 FL (ref 80–100)
MONOCYTES # BLD AUTO: 0.73 X10*3/UL (ref 0.05–0.8)
MONOCYTES NFR BLD AUTO: 9.3 %
NEUTROPHILS # BLD AUTO: 4.71 X10*3/UL (ref 1.6–5.5)
NEUTROPHILS NFR BLD AUTO: 60.2 %
NON-HDL CHOLESTEROL: 80 MG/DL (ref 0–149)
NRBC BLD-RTO: 0 /100 WBCS (ref 0–0)
PLATELET # BLD AUTO: 291 X10*3/UL (ref 150–450)
POTASSIUM SERPL-SCNC: 3.2 MMOL/L (ref 3.5–5.3)
PROT SERPL-MCNC: 6.8 G/DL (ref 6.4–8.2)
RBC # BLD AUTO: 4.3 X10*6/UL (ref 4–5.2)
SODIUM SERPL-SCNC: 140 MMOL/L (ref 136–145)
TSH SERPL-ACNC: 1.9 MIU/L (ref 0.44–3.98)
WBC # BLD AUTO: 7.8 X10*3/UL (ref 4.4–11.3)

## 2024-07-11 PROCEDURE — 85025 COMPLETE CBC W/AUTO DIFF WBC: CPT

## 2024-07-11 PROCEDURE — 82465 ASSAY BLD/SERUM CHOLESTEROL: CPT

## 2024-07-11 PROCEDURE — 36415 COLL VENOUS BLD VENIPUNCTURE: CPT

## 2024-07-11 PROCEDURE — 84443 ASSAY THYROID STIM HORMONE: CPT

## 2024-07-11 PROCEDURE — 80053 COMPREHEN METABOLIC PANEL: CPT

## 2024-07-11 PROCEDURE — 83718 ASSAY OF LIPOPROTEIN: CPT

## 2024-07-11 PROCEDURE — 82248 BILIRUBIN DIRECT: CPT

## 2024-07-15 ENCOUNTER — HOSPITAL ENCOUNTER (OUTPATIENT)
Dept: RADIOLOGY | Facility: CLINIC | Age: 74
Discharge: HOME | End: 2024-07-15
Payer: MEDICARE

## 2024-07-15 DIAGNOSIS — Z78.0 POSTMENOPAUSAL: ICD-10-CM

## 2024-07-15 PROCEDURE — 77080 DXA BONE DENSITY AXIAL: CPT

## 2024-07-15 PROCEDURE — 77080 DXA BONE DENSITY AXIAL: CPT | Performed by: RADIOLOGY

## 2024-07-16 ENCOUNTER — TELEPHONE (OUTPATIENT)
Dept: PRIMARY CARE | Facility: CLINIC | Age: 74
End: 2024-07-16
Payer: MEDICARE

## 2024-07-16 NOTE — TELEPHONE ENCOUNTER
Notified    ----- Message from Mark Diamond sent at 7/12/2024  4:35 PM EDT -----  I have reviewed your recent blood tests ordered by me and there were no significant abnormality noted except potassium was slightly low. She can eat one banana or orange a day . Will monitor it during future visit . Please notify patient. Thanks.

## 2024-07-19 ENCOUNTER — TELEPHONE (OUTPATIENT)
Dept: PRIMARY CARE | Facility: CLINIC | Age: 74
End: 2024-07-19
Payer: MEDICARE

## 2024-07-19 NOTE — TELEPHONE ENCOUNTER
Notified on voice identified mailbox    ----- Message from Mark Diamond sent at 7/16/2024  3:40 PM EDT -----  Slight weakness in bones without definite osteoporosis noted on recent bone density test. You should take Calcium 600 mg with Vit D 400 units one tablet twice daily for your bones. It is over the counter. Let me know if any questions. Please notify patient. Thanks.

## 2024-08-06 ENCOUNTER — TELEPHONE (OUTPATIENT)
Dept: PRIMARY CARE | Facility: CLINIC | Age: 74
End: 2024-08-06
Payer: MEDICARE

## 2024-08-06 NOTE — TELEPHONE ENCOUNTER
Patient states that she has a scheduled colonoscopy ans she states that fasting while taking the needed medications will make her too dizzy and was wondering. She was wondering if she could get a cologaurd test instead and was wondering would  signs of polyps show on the test. Please advise.

## 2024-08-14 DIAGNOSIS — I10 HTN (HYPERTENSION), BENIGN: ICD-10-CM

## 2024-08-14 RX ORDER — METOPROLOL TARTRATE AND HYDROCHLOROTHIAZIDE 50; 25 MG/1; MG/1
1 TABLET ORAL DAILY
Qty: 90 TABLET | Refills: 3 | Status: SHIPPED | OUTPATIENT
Start: 2024-08-14

## 2024-10-31 DIAGNOSIS — K44.9 HIATAL HERNIA: ICD-10-CM

## 2024-11-01 RX ORDER — OMEPRAZOLE 20 MG/1
20 CAPSULE, DELAYED RELEASE ORAL DAILY
Qty: 90 CAPSULE | Refills: 3 | Status: SHIPPED | OUTPATIENT
Start: 2024-11-01

## 2024-12-10 ENCOUNTER — APPOINTMENT (OUTPATIENT)
Dept: PRIMARY CARE | Facility: CLINIC | Age: 74
End: 2024-12-10
Payer: MEDICARE

## 2024-12-10 VITALS
HEART RATE: 78 BPM | SYSTOLIC BLOOD PRESSURE: 132 MMHG | DIASTOLIC BLOOD PRESSURE: 86 MMHG | BODY MASS INDEX: 32.27 KG/M2 | WEIGHT: 188 LBS | TEMPERATURE: 97.3 F | OXYGEN SATURATION: 97 %

## 2024-12-10 DIAGNOSIS — I10 HTN (HYPERTENSION), BENIGN: Primary | ICD-10-CM

## 2024-12-10 DIAGNOSIS — Z86.0100 HISTORY OF COLON POLYPS: ICD-10-CM

## 2024-12-10 DIAGNOSIS — N18.31 CHRONIC KIDNEY DISEASE, STAGE 3A (MULTI): ICD-10-CM

## 2024-12-10 DIAGNOSIS — Z95.5 PRESENCE OF STENT IN CORONARY ARTERY IN PATIENT WITH CORONARY ARTERY DISEASE: ICD-10-CM

## 2024-12-10 DIAGNOSIS — I25.10 PRESENCE OF STENT IN CORONARY ARTERY IN PATIENT WITH CORONARY ARTERY DISEASE: ICD-10-CM

## 2024-12-10 DIAGNOSIS — E78.5 HYPERLIPIDEMIA, UNSPECIFIED HYPERLIPIDEMIA TYPE: ICD-10-CM

## 2024-12-10 PROBLEM — E66.01 OBESITY, MORBID (MULTI): Status: RESOLVED | Noted: 2023-06-06 | Resolved: 2024-12-10

## 2024-12-10 PROCEDURE — 3079F DIAST BP 80-89 MM HG: CPT | Performed by: INTERNAL MEDICINE

## 2024-12-10 PROCEDURE — 99214 OFFICE O/P EST MOD 30 MIN: CPT | Performed by: INTERNAL MEDICINE

## 2024-12-10 PROCEDURE — 1159F MED LIST DOCD IN RCRD: CPT | Performed by: INTERNAL MEDICINE

## 2024-12-10 PROCEDURE — 3075F SYST BP GE 130 - 139MM HG: CPT | Performed by: INTERNAL MEDICINE

## 2024-12-10 PROCEDURE — G2211 COMPLEX E/M VISIT ADD ON: HCPCS | Performed by: INTERNAL MEDICINE

## 2024-12-10 ASSESSMENT — PATIENT HEALTH QUESTIONNAIRE - PHQ9
SUM OF ALL RESPONSES TO PHQ9 QUESTIONS 1 AND 2: 0
1. LITTLE INTEREST OR PLEASURE IN DOING THINGS: NOT AT ALL
2. FEELING DOWN, DEPRESSED OR HOPELESS: NOT AT ALL

## 2024-12-10 NOTE — PATIENT INSTRUCTIONS
Plan  Current medications are effective. advised to continue current medications.  Colonoscopy ordered  Questions related to med answered.   See dermatology for skin tag. She would like to talk to [facility] first about skin tag in her neck  F/U in June for medicare wellness

## 2024-12-10 NOTE — PROGRESS NOTES
My nurse note reviewed. Patient is here for:  Follow-up (New colonoscopy)       Patient denies any shortness of breath, PND, orthopnea, chest pain , palpitation, syncope or edema in legs  patient denies any abdominal pain, tenderness, nausea, vomiting, change in bowel habits or blood in stool.    Wants to have colonoscopy done.   No side effects on blood thinner  I have reviewed all active medications patient is currently on . Questions related to medication answered to patient's satisfaction.  Lives alone. Son visits off and on .   OBJECTIVE :  /86   Pulse 78   Temp 36.3 °C (97.3 °F)   Wt 85.3 kg (188 lb)   SpO2 97%   BMI 32.27 kg/m²   Has skin tab on neck   CVS: S1 S2 + , no S3. No loud heart murmur appreciated. Lungs clear, No edema.  Abdomen: soft, non tender. No organomegaly noted. BS +. No CVA tenderness noted.      Assessment:  1. HTN (hypertension), benign  controlled        2. History of colon polyps  Hx of. Colonoscopy ordered      3. Hyperlipidemia, unspecified hyperlipidemia type  Hx of.       4. Presence of stent in coronary artery in patient with coronary artery disease  Hx of stable on meds       5 CKD -hx of  . Doing ok   Lab Results   Component Value Date    CREATININE 1.04 07/11/2024           During office visit today patient's chronic diagnosis were reviewed and questions related to it answered to patient's satisfaction . Risk factors for heart, stroke were discussed with patient . Discussion about preventative tests were done with patient also . pain issue was discussed as appropriate for patient . I plan to follow up patient's chronic medical conditions as appropriate.     Plan  Current medications are effective. advised to continue current medications.  Colonoscopy ordered  Questions related to med answered.   See dermatology for skin tag. She would like to talk to [facility] first about skin tag in her neck  F/U in June for medicare wellness

## 2024-12-16 ENCOUNTER — APPOINTMENT (OUTPATIENT)
Dept: RADIOLOGY | Facility: CLINIC | Age: 74
End: 2024-12-16
Payer: MEDICARE

## 2024-12-26 ENCOUNTER — HOSPITAL ENCOUNTER (OUTPATIENT)
Dept: RADIOLOGY | Facility: CLINIC | Age: 74
Discharge: HOME | End: 2024-12-26
Payer: MEDICARE

## 2024-12-26 VITALS — HEIGHT: 64 IN | BODY MASS INDEX: 32.11 KG/M2 | WEIGHT: 188.05 LBS

## 2024-12-26 DIAGNOSIS — Z12.31 ENCOUNTER FOR SCREENING MAMMOGRAM FOR BREAST CANCER: ICD-10-CM

## 2024-12-26 PROCEDURE — 77067 SCR MAMMO BI INCL CAD: CPT

## 2024-12-26 PROCEDURE — 77063 BREAST TOMOSYNTHESIS BI: CPT | Performed by: RADIOLOGY

## 2024-12-26 PROCEDURE — 77067 SCR MAMMO BI INCL CAD: CPT | Performed by: RADIOLOGY

## 2025-02-20 DIAGNOSIS — I25.10 CORONARY ARTERY DISEASE INVOLVING NATIVE CORONARY ARTERY OF NATIVE HEART WITHOUT ANGINA PECTORIS: ICD-10-CM

## 2025-02-21 RX ORDER — ATORVASTATIN CALCIUM 40 MG/1
40 TABLET, FILM COATED ORAL DAILY
Qty: 90 TABLET | Refills: 3 | Status: SHIPPED | OUTPATIENT
Start: 2025-02-21

## 2025-02-26 PROBLEM — E66.811 CLASS 1 OBESITY WITH BODY MASS INDEX (BMI) OF 32.0 TO 32.9 IN ADULT: Status: ACTIVE | Noted: 2023-06-05

## 2025-03-19 ENCOUNTER — OFFICE VISIT (OUTPATIENT)
Dept: CARDIOLOGY | Facility: CLINIC | Age: 75
End: 2025-03-19
Payer: MEDICARE

## 2025-03-19 VITALS
BODY MASS INDEX: 32.3 KG/M2 | HEIGHT: 64 IN | OXYGEN SATURATION: 98 % | DIASTOLIC BLOOD PRESSURE: 78 MMHG | WEIGHT: 189.2 LBS | SYSTOLIC BLOOD PRESSURE: 130 MMHG | HEART RATE: 63 BPM

## 2025-03-19 DIAGNOSIS — I10 HTN (HYPERTENSION), BENIGN: ICD-10-CM

## 2025-03-19 DIAGNOSIS — I21.9 MYOCARDIAL INFARCTION, UNSPECIFIED MI TYPE, UNSPECIFIED ARTERY (MULTI): ICD-10-CM

## 2025-03-19 DIAGNOSIS — I25.10 CORONARY ARTERY DISEASE INVOLVING NATIVE CORONARY ARTERY OF NATIVE HEART WITHOUT ANGINA PECTORIS: ICD-10-CM

## 2025-03-19 PROCEDURE — 1036F TOBACCO NON-USER: CPT | Performed by: STUDENT IN AN ORGANIZED HEALTH CARE EDUCATION/TRAINING PROGRAM

## 2025-03-19 PROCEDURE — 3075F SYST BP GE 130 - 139MM HG: CPT | Performed by: STUDENT IN AN ORGANIZED HEALTH CARE EDUCATION/TRAINING PROGRAM

## 2025-03-19 PROCEDURE — 1159F MED LIST DOCD IN RCRD: CPT | Performed by: STUDENT IN AN ORGANIZED HEALTH CARE EDUCATION/TRAINING PROGRAM

## 2025-03-19 PROCEDURE — 99213 OFFICE O/P EST LOW 20 MIN: CPT | Performed by: STUDENT IN AN ORGANIZED HEALTH CARE EDUCATION/TRAINING PROGRAM

## 2025-03-19 PROCEDURE — 99203 OFFICE O/P NEW LOW 30 MIN: CPT | Performed by: STUDENT IN AN ORGANIZED HEALTH CARE EDUCATION/TRAINING PROGRAM

## 2025-03-19 PROCEDURE — 3078F DIAST BP <80 MM HG: CPT | Performed by: STUDENT IN AN ORGANIZED HEALTH CARE EDUCATION/TRAINING PROGRAM

## 2025-03-19 PROCEDURE — 93005 ELECTROCARDIOGRAM TRACING: CPT | Performed by: STUDENT IN AN ORGANIZED HEALTH CARE EDUCATION/TRAINING PROGRAM

## 2025-03-19 NOTE — PROGRESS NOTES
Referred by Dr. Guallpa for Coronary Artery Disease, Hypertension, Hyperlipidemia, MI, and stent (Annual/Declines EKG)     History Of Present Illness:    Suzette Goodman is a 75 y.o. female past med history notable for NSTEMI/CAD status post drug-eluting stent to the PDA mild residual disease in LAD and left circumflex [LVEF 55%], hypertension, hyperlipidemia presents to our clinic to establish care.  Patient is asymptomatic at baseline.  Denies chest pain shortness of breath.  No lightness dizziness or syncopal events.  Declining baseline EKG today.  No recent hospitalizations or surgeries planned.      Past Medical History:  She has a past medical history of Encounter for gynecological examination (general) (routine) without abnormal findings, Encounter for screening for other viral diseases (04/17/2019), Person consulting for explanation of examination or test findings, Personal history of other diseases of the respiratory system (01/15/2018), Personal history of other diseases of the respiratory system (01/15/2018), Personal history of other diseases of the respiratory system (02/21/2017), Personal history of other drug therapy (08/13/2020), Personal history of other medical treatment, Personal history of other specified conditions (06/18/2019), Personal history of other specified conditions (06/18/2019), and Personal history of other specified conditions.    Past Surgical History:  She has a past surgical history that includes Other surgical history (02/04/2021) and vacumn assist breast bx rt (Right, 12/06/2010).      Social History:  She reports that she has never smoked. She has never used smokeless tobacco. She reports that she does not drink alcohol. No history on file for drug use.    Family History:  No family history on file.     Allergies:  Latex and Other    Outpatient Medications:  Current Outpatient Medications   Medication Instructions    aspirin 81 mg EC tablet Take by mouth.    atorvastatin  "(LIPITOR) 40 mg, oral, Daily    clopidogrel (PLAVIX) 75 mg, oral, Daily    ibuprofen 600 mg tablet Every 8 hours    metoprolol succinate XL (Toprol-XL) 50 mg 24 hr tablet 1 tablet, Daily    metoprolol ta-hydrochlorothiaz (Lopressor HCT) 50-25 mg tablet 1 tablet, oral, Daily    multivitamin tablet Take by mouth.    omeprazole (PRILOSEC) 20 mg, oral, Daily    Pfizer COVID Bival,12y up,,PF, 30 mcg/0.3 mL suspension vaccine 0.3 mL, Once as needed        Last Recorded Vitals:  Vitals:    03/19/25 1355   BP: 130/78   BP Location: Left arm   Patient Position: Sitting   BP Cuff Size: Adult   Pulse: 63   SpO2: 98%   Weight: 85.8 kg (189 lb 3.2 oz)   Height: 1.626 m (5' 4\")       Physical Exam:  General: No acute distress,  A&O x3  Skin: Warm and dry  Neck: JVD is not elevated  ENT: Moist mucous membranes no lesions appreciated  Pulmonary: CTAB  Cards: Regular rate rhythm, no murmurs gallops or rubs appreciated normal S1-S2  Abdomen: Soft nontender nondistended  Extremities: No edema or cyanosis  Psych: Appropriate mood and affect     Physical Exam               Last Labs:  CBC -  Lab Results   Component Value Date    WBC 7.8 07/11/2024    HGB 13.9 07/11/2024    HCT 39.9 07/11/2024    MCV 93 07/11/2024     07/11/2024       CMP -  Lab Results   Component Value Date    CALCIUM 9.3 07/11/2024    PROT 6.8 07/11/2024    ALBUMIN 4.4 07/11/2024    AST 21 07/11/2024    ALT 21 07/11/2024    ALKPHOS 63 07/11/2024    BILITOT 0.7 07/11/2024       LIPID PANEL -   Lab Results   Component Value Date    CHOL 144 07/11/2024    TRIG 80 12/23/2020    HDL 64.5 07/11/2024    CHHDL 2.2 07/11/2024    LDLF 101 (H) 12/23/2020    VLDL 16 12/23/2020    NHDL 148 05/10/2018       RENAL FUNCTION PANEL -   Lab Results   Component Value Date    GLUCOSE 104 (H) 07/11/2024     07/11/2024    K 3.2 (L) 07/11/2024     07/11/2024    CO2 27 07/11/2024    ANIONGAP 15 07/11/2024    BUN 33 (H) 07/11/2024    CREATININE 1.04 07/11/2024    CALCIUM 9.3 " 07/11/2024    ALBUMIN 4.4 07/11/2024        Lab Results   Component Value Date     (H) 12/21/2020    HGBA1C 5.5 06/17/2021       Last Cardiology Tests:  ECG:  No results found for this or any previous visit from the past 1095 days.    Assessment/Plan     1.  CAD/NSTEMI: 2021 requiring PCI to the PDA with mild residual disease in the LAD and left circumflex territories.  Doing well today.  Asymptomatic.  -Continue DAPT  -Continue high intensity statin therapy  -Continue beta-blockade    2.  Hypertension: Blood pressure reasonable control with current medical therapy.    3.  Hyperlipidemia: Cholesterol is reasonably controlled with current dose of atorvastatin.    Follow-up in 1 year    (This note was generated with voice recognition software and may contain errors including spelling, grammar, syntax and missed recognition of what was dictated, of which may not have been fully corrected)     Parth Gulalpa MD PhD

## 2025-05-23 DIAGNOSIS — Z95.5 PRESENCE OF STENT IN CORONARY ARTERY IN PATIENT WITH CORONARY ARTERY DISEASE: ICD-10-CM

## 2025-05-23 DIAGNOSIS — I25.10 PRESENCE OF STENT IN CORONARY ARTERY IN PATIENT WITH CORONARY ARTERY DISEASE: ICD-10-CM

## 2025-05-24 RX ORDER — CLOPIDOGREL BISULFATE 75 MG/1
75 TABLET ORAL DAILY
Qty: 90 TABLET | Refills: 3 | Status: SHIPPED | OUTPATIENT
Start: 2025-05-24 | End: 2026-05-24

## 2025-06-10 ENCOUNTER — APPOINTMENT (OUTPATIENT)
Dept: PRIMARY CARE | Facility: CLINIC | Age: 75
End: 2025-06-10
Payer: MEDICARE

## 2025-06-10 VITALS
SYSTOLIC BLOOD PRESSURE: 122 MMHG | WEIGHT: 181.2 LBS | HEART RATE: 64 BPM | TEMPERATURE: 97.1 F | BODY MASS INDEX: 30.93 KG/M2 | HEIGHT: 64 IN | DIASTOLIC BLOOD PRESSURE: 72 MMHG | OXYGEN SATURATION: 96 %

## 2025-06-10 DIAGNOSIS — I25.10 PRESENCE OF STENT IN CORONARY ARTERY IN PATIENT WITH CORONARY ARTERY DISEASE: ICD-10-CM

## 2025-06-10 DIAGNOSIS — Z13.39 ALCOHOL SCREENING: ICD-10-CM

## 2025-06-10 DIAGNOSIS — N18.31 CHRONIC KIDNEY DISEASE, STAGE 3A (MULTI): ICD-10-CM

## 2025-06-10 DIAGNOSIS — Z12.11 COLON CANCER SCREENING: ICD-10-CM

## 2025-06-10 DIAGNOSIS — Z00.00 ANNUAL PHYSICAL EXAM: ICD-10-CM

## 2025-06-10 DIAGNOSIS — Z95.5 PRESENCE OF STENT IN CORONARY ARTERY IN PATIENT WITH CORONARY ARTERY DISEASE: ICD-10-CM

## 2025-06-10 DIAGNOSIS — I25.10 CORONARY ARTERY DISEASE INVOLVING NATIVE CORONARY ARTERY OF NATIVE HEART WITHOUT ANGINA PECTORIS: ICD-10-CM

## 2025-06-10 DIAGNOSIS — I25.2 HX OF MYOCARDIAL INFARCTION: ICD-10-CM

## 2025-06-10 DIAGNOSIS — I10 HTN (HYPERTENSION), BENIGN: ICD-10-CM

## 2025-06-10 DIAGNOSIS — Z86.0100 HISTORY OF COLON POLYPS: ICD-10-CM

## 2025-06-10 DIAGNOSIS — Z71.89 ADVANCE DIRECTIVE DISCUSSED WITH PATIENT: ICD-10-CM

## 2025-06-10 DIAGNOSIS — E78.5 HYPERLIPIDEMIA, UNSPECIFIED HYPERLIPIDEMIA TYPE: ICD-10-CM

## 2025-06-10 DIAGNOSIS — Z13.31 DEPRESSION SCREEN: ICD-10-CM

## 2025-06-10 DIAGNOSIS — Z00.00 MEDICARE ANNUAL WELLNESS VISIT, SUBSEQUENT: Primary | ICD-10-CM

## 2025-06-10 RX ORDER — NITROGLYCERIN 0.4 MG/1
0.4 TABLET SUBLINGUAL EVERY 5 MIN PRN
Qty: 100 TABLET | Refills: 11 | Status: SHIPPED | OUTPATIENT
Start: 2025-06-10 | End: 2026-06-10

## 2025-06-10 ASSESSMENT — ACTIVITIES OF DAILY LIVING (ADL)
MANAGING_FINANCES: INDEPENDENT
DRESSING: INDEPENDENT
DOING_HOUSEWORK: INDEPENDENT
GROCERY_SHOPPING: INDEPENDENT
BATHING: INDEPENDENT
TAKING_MEDICATION: INDEPENDENT

## 2025-06-10 ASSESSMENT — ENCOUNTER SYMPTOMS
DEPRESSION: 0
OCCASIONAL FEELINGS OF UNSTEADINESS: 0
LOSS OF SENSATION IN FEET: 0

## 2025-06-10 ASSESSMENT — PAIN SCALES - GENERAL: PAINLEVEL_OUTOF10: 0-NO PAIN

## 2025-06-10 NOTE — PROGRESS NOTES
My nurse note reviewed. Patient is here for:  Medicare Annual Wellness Visit Subsequent       Pt  is here for medicare wellness, physical and follow up  Lives with her cat Lidia, son comes for visit .   Patient denies any shortness of breath, PND, orthopnea, chest pain , palpitation, syncope or edema in legs  patient denies any abdominal pain, tenderness, nausea, vomiting, change in bowel habits or blood in stool.  Patient denies any weakness in extremities.. Denies any headache, visual symptoms , speech problems or  tremors . No TIA or stroke like symptoms..    Over the past 2 weeks, how often have you been bothered by any of the following problems?  Little interest or pleasure in doing things: Not at all  Feeling down, depressed, or hopeless: Not at all  Functional Ability/Level of Safety  Cognitive Impairment Observed: No cognitive impairment observed  Nutrition and Exercise  Current Diet: Well Balanced Diet, Unhealthy Diet  Adequate Fluid Intake: Yes  Caffeine: No  Exercise Frequency: Infrequently  IADL's  Grocery Shopping: Independent  Doing Housework: Independent  Taking Medication: Independent  Managing Finances: Independent  [place] Home Safety Risk Factors  Home Safety Risk Factors: No stair Handrails     During Medicare wellness apart from looking at assessment done by nurse,  I also asked following :    Alcohol use : Alcohol screening  was  done during this visit. Patient is not having any issue with increase alcohol use . No ETOH abuse was observed by history . No drinking at all.  Non smoker  Depression screen:  > 5 minutes  were spent screening for depression using PHQ2/PHQ9 as documented in the chart.    HIV test: patient was not found to be high risk for HIV     Cognitive issue : None     Advance directives:. Advance Care Planning discussed and documented in the medical record, patient did not wish or was not able to name a surrogate decision maker or provide an advance care plan. Patient has no living  "will. Patient has no healthcare POA. Total time was > 16 min    Additional Information: discussed about living will. Questions answered to patient's satisfaction.    I have reviewed all active medications patient is currently on . Questions related to medication answered to patient's satisfaction.    Has some financial constrainment .   OBJECTIVE :    /72   Pulse 64   Temp 36.2 °C (97.1 °F)   Ht 1.626 m (5' 4\")   Wt 82.2 kg (181 lb 3.2 oz)   SpO2 96%   BMI 31.10 kg/m²   Vitals noted  Not in acute distress  Conj Pink, No icterus  ears exam normal  Neck:No Cervical LN enlargement, No Thyroid enlargement No carotid bruit  Lungs: good air entry bilaterally, no rales or rhonchi  CVS: S1 S2 + , no S3. No loud heart murmur heard.   Abdomen: Soft, non tender , BS +. no organomegaly , no CVA tenderness  MSK: No spine tenderness or muscle tenderness noted on gross examination  CNS: Pt is alert, moving all 4 extremities. no motor weakness or abnormal movements noted on gross examination.  Extremities: No edema, No calf tenderness, Jude's sign negative. DTR + knee and wrists, Rhomberg's neg    Assessment:  1. Medicare annual wellness visit, subsequent  Done.       2. Annual physical exam  Done. Tests ordered      3. Alcohol screening        4. Depression screen        5. Advance directive discussed with patient  Questions answered.      6. Coronary artery disease involving native coronary artery of native heart without angina pectoris  Hx of. stable      7. HTN (hypertension), benign  controlled        8. History of colon polyps  Not inclined to have colonoscopy . Agreed to go for cologard test      9. Chronic kidney disease, stage 3a (Multi)  Hx of.   Lab Results   Component Value Date    CREATININE 1.04 07/11/2024               10. Hx of myocardial infarction  Sees cardiologist       11. Hyperlipidemia, unspecified hyperlipidemia type  nitroglycerin (Nitrostat) 0.4 mg SL tablet    CBC and Auto Differential    " Basic Metabolic Panel    Hepatic Function Panel    Lipid Panel Non-Fasting    Thyroid Stimulating Hormone    CBC and Auto Differential    Basic Metabolic Panel    Hepatic Function Panel    Lipid Panel Non-Fasting    Thyroid Stimulating Hormone      12. Presence of stent in coronary artery in patient with coronary artery disease  Hx of. Stable. No angina currently       13. Colon cancer screening  Cologuard® colon cancer screening    Cologuard® colon cancer screening        Plan  Medicare wellness done.   Annual physical done. Tests ordered  Blood tests   Cologard test ordered  Current medications are effective. advised to continue current medications.  Prescription done for nitroglycern. Tablets  Discussed about obesity and complications related to it. Discussed about weight reduction and regular exercise to decrease weight. Questions related to it answered to patient's satisfaction.  F/U 6 months and in  12 months for medicare wellness.

## 2025-06-10 NOTE — PATIENT INSTRUCTIONS
Plan  Medicare wellness done.   Annual physical done. Tests ordered  Blood tests   Cologard test ordered  Current medications are effective. advised to continue current medications.  Prescription done for nitroglycern. Tablets  Discussed about obesity and complications related to it. Discussed about weight reduction and regular exercise to decrease weight. Questions related to it answered to patient's satisfaction.  F/U 6 months and in  12 months for medicare wellness.

## 2025-06-11 LAB
ALBUMIN SERPL-MCNC: 4.5 G/DL (ref 3.6–5.1)
ALBUMIN/GLOB SERPL: 1.8 (CALC) (ref 1–2.5)
ALP SERPL-CCNC: 79 U/L (ref 37–153)
ALT SERPL-CCNC: 15 U/L (ref 6–29)
ANION GAP SERPL CALCULATED.4IONS-SCNC: 13 MMOL/L (CALC) (ref 7–17)
AST SERPL-CCNC: 19 U/L (ref 10–35)
BASOPHILS # BLD AUTO: 30 CELLS/UL (ref 0–200)
BASOPHILS NFR BLD AUTO: 0.4 %
BILIRUB DIRECT SERPL-MCNC: 0.2 MG/DL
BILIRUB INDIRECT SERPL-MCNC: 0.7 MG/DL (CALC) (ref 0.2–1.2)
BILIRUB SERPL-MCNC: 0.9 MG/DL (ref 0.2–1.2)
BUN SERPL-MCNC: 26 MG/DL (ref 7–25)
BUN/CREAT SERPL: 22 (CALC) (ref 6–22)
CALCIUM SERPL-MCNC: 9.9 MG/DL (ref 8.6–10.4)
CHLORIDE SERPL-SCNC: 96 MMOL/L (ref 98–110)
CHOLEST SERPL-MCNC: 204 MG/DL
CHOLEST/HDLC SERPL: 3.8 (CALC)
CO2 SERPL-SCNC: 29 MMOL/L (ref 20–32)
CREAT SERPL-MCNC: 1.17 MG/DL (ref 0.6–1)
EGFRCR SERPLBLD CKD-EPI 2021: 48 ML/MIN/1.73M2
EOSINOPHIL # BLD AUTO: 91 CELLS/UL (ref 15–500)
EOSINOPHIL NFR BLD AUTO: 1.2 %
ERYTHROCYTE [DISTWIDTH] IN BLOOD BY AUTOMATED COUNT: 12.7 % (ref 11–15)
GLOBULIN SER CALC-MCNC: 2.5 G/DL (CALC) (ref 1.9–3.7)
GLUCOSE SERPL-MCNC: 119 MG/DL (ref 65–99)
HCT VFR BLD AUTO: 43.1 % (ref 35–45)
HDLC SERPL-MCNC: 54 MG/DL
HGB BLD-MCNC: 14.4 G/DL (ref 11.7–15.5)
LDLC SERPL CALC-MCNC: 114 MG/DL (CALC)
LYMPHOCYTES # BLD AUTO: 1672 CELLS/UL (ref 850–3900)
LYMPHOCYTES NFR BLD AUTO: 22 %
MCH RBC QN AUTO: 31.4 PG (ref 27–33)
MCHC RBC AUTO-ENTMCNC: 33.4 G/DL (ref 32–36)
MCV RBC AUTO: 93.9 FL (ref 80–100)
MONOCYTES # BLD AUTO: 654 CELLS/UL (ref 200–950)
MONOCYTES NFR BLD AUTO: 8.6 %
NEUTROPHILS # BLD AUTO: 5153 CELLS/UL (ref 1500–7800)
NEUTROPHILS NFR BLD AUTO: 67.8 %
NONHDLC SERPL-MCNC: 150 MG/DL (CALC)
PLATELET # BLD AUTO: 309 THOUSAND/UL (ref 140–400)
PMV BLD REES-ECKER: 10.2 FL (ref 7.5–12.5)
POTASSIUM SERPL-SCNC: 3.5 MMOL/L (ref 3.5–5.3)
PROT SERPL-MCNC: 7 G/DL (ref 6.1–8.1)
RBC # BLD AUTO: 4.59 MILLION/UL (ref 3.8–5.1)
SODIUM SERPL-SCNC: 138 MMOL/L (ref 135–146)
TRIGL SERPL-MCNC: 239 MG/DL
TSH SERPL-ACNC: 2.92 MIU/L (ref 0.4–4.5)
WBC # BLD AUTO: 7.6 THOUSAND/UL (ref 3.8–10.8)

## 2025-06-26 LAB — NONINV COLON CA DNA+OCC BLD SCRN STL QL: NEGATIVE

## 2025-07-03 ENCOUNTER — TELEPHONE (OUTPATIENT)
Dept: PRIMARY CARE | Facility: CLINIC | Age: 75
End: 2025-07-03
Payer: MEDICARE

## 2025-08-15 ENCOUNTER — HOSPITAL ENCOUNTER (EMERGENCY)
Facility: HOSPITAL | Age: 75
Discharge: HOME | End: 2025-08-16
Attending: EMERGENCY MEDICINE
Payer: MEDICARE

## 2025-08-15 ENCOUNTER — APPOINTMENT (OUTPATIENT)
Dept: RADIOLOGY | Facility: HOSPITAL | Age: 75
End: 2025-08-15
Payer: MEDICARE

## 2025-08-15 ENCOUNTER — APPOINTMENT (OUTPATIENT)
Dept: CARDIOLOGY | Facility: HOSPITAL | Age: 75
End: 2025-08-15
Payer: MEDICARE

## 2025-08-15 VITALS
SYSTOLIC BLOOD PRESSURE: 149 MMHG | BODY MASS INDEX: 30.9 KG/M2 | DIASTOLIC BLOOD PRESSURE: 74 MMHG | HEART RATE: 78 BPM | OXYGEN SATURATION: 98 % | RESPIRATION RATE: 18 BRPM | WEIGHT: 181 LBS | HEIGHT: 64 IN | TEMPERATURE: 98.1 F

## 2025-08-15 DIAGNOSIS — K44.9 HIATAL HERNIA: ICD-10-CM

## 2025-08-15 DIAGNOSIS — R07.9 CHEST PAIN, UNSPECIFIED TYPE: Primary | ICD-10-CM

## 2025-08-15 LAB
ALBUMIN SERPL BCP-MCNC: 4.5 G/DL (ref 3.4–5)
ALP SERPL-CCNC: 68 U/L (ref 33–136)
ALT SERPL W P-5'-P-CCNC: 14 U/L (ref 7–45)
ANION GAP SERPL CALC-SCNC: 14 MMOL/L (ref 10–20)
AST SERPL W P-5'-P-CCNC: 19 U/L (ref 9–39)
BASOPHILS # BLD AUTO: 0.03 X10*3/UL (ref 0–0.1)
BASOPHILS NFR BLD AUTO: 0.3 %
BILIRUB DIRECT SERPL-MCNC: 0.1 MG/DL (ref 0–0.3)
BILIRUB SERPL-MCNC: 1 MG/DL (ref 0–1.2)
BUN SERPL-MCNC: 26 MG/DL (ref 6–23)
CALCIUM SERPL-MCNC: 10.4 MG/DL (ref 8.6–10.3)
CARDIAC TROPONIN I PNL SERPL HS: 11 NG/L (ref 0–13)
CARDIAC TROPONIN I PNL SERPL HS: 9 NG/L (ref 0–13)
CHLORIDE SERPL-SCNC: 99 MMOL/L (ref 98–107)
CO2 SERPL-SCNC: 28 MMOL/L (ref 21–32)
CREAT SERPL-MCNC: 1.11 MG/DL (ref 0.5–1.05)
EGFRCR SERPLBLD CKD-EPI 2021: 52 ML/MIN/1.73M*2
EOSINOPHIL # BLD AUTO: 0.2 X10*3/UL (ref 0–0.4)
EOSINOPHIL NFR BLD AUTO: 1.7 %
ERYTHROCYTE [DISTWIDTH] IN BLOOD BY AUTOMATED COUNT: 12.4 % (ref 11.5–14.5)
GLUCOSE SERPL-MCNC: 91 MG/DL (ref 74–99)
HCT VFR BLD AUTO: 43.4 % (ref 36–46)
HGB BLD-MCNC: 14.2 G/DL (ref 12–16)
IMM GRANULOCYTES # BLD AUTO: 0.05 X10*3/UL (ref 0–0.5)
IMM GRANULOCYTES NFR BLD AUTO: 0.4 % (ref 0–0.9)
LIPASE SERPL-CCNC: 21 U/L (ref 9–82)
LYMPHOCYTES # BLD AUTO: 2.45 X10*3/UL (ref 0.8–3)
LYMPHOCYTES NFR BLD AUTO: 21.4 %
MCH RBC QN AUTO: 30.9 PG (ref 26–34)
MCHC RBC AUTO-ENTMCNC: 32.7 G/DL (ref 32–36)
MCV RBC AUTO: 95 FL (ref 80–100)
MONOCYTES # BLD AUTO: 1.04 X10*3/UL (ref 0.05–0.8)
MONOCYTES NFR BLD AUTO: 9.1 %
NEUTROPHILS # BLD AUTO: 7.67 X10*3/UL (ref 1.6–5.5)
NEUTROPHILS NFR BLD AUTO: 67.1 %
NRBC BLD-RTO: 0 /100 WBCS (ref 0–0)
PLATELET # BLD AUTO: 271 X10*3/UL (ref 150–450)
POTASSIUM SERPL-SCNC: 3.2 MMOL/L (ref 3.5–5.3)
PROT SERPL-MCNC: 7.5 G/DL (ref 6.4–8.2)
RBC # BLD AUTO: 4.59 X10*6/UL (ref 4–5.2)
SODIUM SERPL-SCNC: 138 MMOL/L (ref 136–145)
WBC # BLD AUTO: 11.4 X10*3/UL (ref 4.4–11.3)

## 2025-08-15 PROCEDURE — 2500000005 HC RX 250 GENERAL PHARMACY W/O HCPCS: Performed by: EMERGENCY MEDICINE

## 2025-08-15 PROCEDURE — 2500000001 HC RX 250 WO HCPCS SELF ADMINISTERED DRUGS (ALT 637 FOR MEDICARE OP): Performed by: EMERGENCY MEDICINE

## 2025-08-15 PROCEDURE — 80048 BASIC METABOLIC PNL TOTAL CA: CPT | Performed by: EMERGENCY MEDICINE

## 2025-08-15 PROCEDURE — 99285 EMERGENCY DEPT VISIT HI MDM: CPT | Mod: 25 | Performed by: EMERGENCY MEDICINE

## 2025-08-15 PROCEDURE — 71046 X-RAY EXAM CHEST 2 VIEWS: CPT | Performed by: RADIOLOGY

## 2025-08-15 PROCEDURE — 93005 ELECTROCARDIOGRAM TRACING: CPT

## 2025-08-15 PROCEDURE — 82248 BILIRUBIN DIRECT: CPT | Performed by: EMERGENCY MEDICINE

## 2025-08-15 PROCEDURE — 84484 ASSAY OF TROPONIN QUANT: CPT | Performed by: EMERGENCY MEDICINE

## 2025-08-15 PROCEDURE — 85025 COMPLETE CBC W/AUTO DIFF WBC: CPT | Performed by: EMERGENCY MEDICINE

## 2025-08-15 PROCEDURE — 96374 THER/PROPH/DIAG INJ IV PUSH: CPT

## 2025-08-15 PROCEDURE — 83690 ASSAY OF LIPASE: CPT | Performed by: EMERGENCY MEDICINE

## 2025-08-15 PROCEDURE — 36415 COLL VENOUS BLD VENIPUNCTURE: CPT | Performed by: EMERGENCY MEDICINE

## 2025-08-15 PROCEDURE — 2500000004 HC RX 250 GENERAL PHARMACY W/ HCPCS (ALT 636 FOR OP/ED): Performed by: EMERGENCY MEDICINE

## 2025-08-15 PROCEDURE — 71046 X-RAY EXAM CHEST 2 VIEWS: CPT

## 2025-08-15 RX ORDER — LIDOCAINE HYDROCHLORIDE 20 MG/ML
15 SOLUTION OROPHARYNGEAL ONCE
Status: COMPLETED | OUTPATIENT
Start: 2025-08-15 | End: 2025-08-15

## 2025-08-15 RX ORDER — OMEPRAZOLE 20 MG/1
20 CAPSULE, DELAYED RELEASE ORAL DAILY
Qty: 90 CAPSULE | Refills: 0 | Status: SHIPPED | OUTPATIENT
Start: 2025-08-15

## 2025-08-15 RX ORDER — PANTOPRAZOLE SODIUM 40 MG/10ML
40 INJECTION, POWDER, LYOPHILIZED, FOR SOLUTION INTRAVENOUS ONCE
Status: COMPLETED | OUTPATIENT
Start: 2025-08-15 | End: 2025-08-15

## 2025-08-15 RX ORDER — ALUMINUM HYDROXIDE, MAGNESIUM HYDROXIDE, AND SIMETHICONE 1200; 120; 1200 MG/30ML; MG/30ML; MG/30ML
30 SUSPENSION ORAL ONCE
Status: COMPLETED | OUTPATIENT
Start: 2025-08-15 | End: 2025-08-15

## 2025-08-15 RX ADMIN — PANTOPRAZOLE SODIUM 40 MG: 40 INJECTION, POWDER, FOR SOLUTION INTRAVENOUS at 22:11

## 2025-08-15 RX ADMIN — LIDOCAINE HYDROCHLORIDE 15 ML: 20 SOLUTION ORAL at 22:11

## 2025-08-15 RX ADMIN — ALUMINUM HYDROXIDE, MAGNESIUM HYDROXIDE, AND DIMETHICONE 30 ML: 200; 20; 200 SUSPENSION ORAL at 22:11

## 2025-08-15 ASSESSMENT — HEART SCORE
HEART SCORE: 4
ECG: NORMAL
AGE: 65+
HISTORY: SLIGHTLY SUSPICIOUS
RISK FACTORS: >2 RISK FACTORS OR HX OF ATHEROSCLEROTIC DISEASE
TROPONIN: LESS THAN OR EQUAL TO NORMAL LIMIT

## 2025-08-15 ASSESSMENT — PAIN SCALES - GENERAL: PAINLEVEL_OUTOF10: 0 - NO PAIN

## 2025-08-15 ASSESSMENT — PAIN - FUNCTIONAL ASSESSMENT: PAIN_FUNCTIONAL_ASSESSMENT: 0-10

## 2025-08-18 LAB
ATRIAL RATE: 79 BPM
P AXIS: 29 DEGREES
P OFFSET: 206 MS
P ONSET: 154 MS
PR INTERVAL: 136 MS
Q ONSET: 222 MS
QRS COUNT: 13 BEATS
QRS DURATION: 68 MS
QT INTERVAL: 382 MS
QTC CALCULATION(BAZETT): 438 MS
QTC FREDERICIA: 418 MS
R AXIS: -10 DEGREES
T AXIS: -7 DEGREES
T OFFSET: 413 MS
VENTRICULAR RATE: 79 BPM

## 2025-09-03 DIAGNOSIS — I10 HTN (HYPERTENSION), BENIGN: ICD-10-CM

## 2025-09-03 RX ORDER — METOPROLOL TARTRATE AND HYDROCHLOROTHIAZIDE 25; 50 MG/1; MG/1
1 TABLET ORAL DAILY
Qty: 90 TABLET | Refills: 3 | Status: SHIPPED | OUTPATIENT
Start: 2025-09-03

## 2025-12-10 ENCOUNTER — APPOINTMENT (OUTPATIENT)
Dept: PRIMARY CARE | Facility: CLINIC | Age: 75
End: 2025-12-10
Payer: MEDICARE

## 2026-06-10 ENCOUNTER — APPOINTMENT (OUTPATIENT)
Dept: PRIMARY CARE | Facility: CLINIC | Age: 76
End: 2026-06-10
Payer: MEDICARE